# Patient Record
Sex: MALE | Race: WHITE | NOT HISPANIC OR LATINO | Employment: FULL TIME | URBAN - METROPOLITAN AREA
[De-identification: names, ages, dates, MRNs, and addresses within clinical notes are randomized per-mention and may not be internally consistent; named-entity substitution may affect disease eponyms.]

---

## 2018-09-15 ENCOUNTER — HOSPITAL ENCOUNTER (EMERGENCY)
Facility: HOSPITAL | Age: 53
Discharge: HOME | End: 2018-09-16
Attending: EMERGENCY MEDICINE
Payer: COMMERCIAL

## 2018-09-15 DIAGNOSIS — R06.02 SOB (SHORTNESS OF BREATH): ICD-10-CM

## 2018-09-15 DIAGNOSIS — R03.0 ELEVATED BLOOD PRESSURE READING WITHOUT DIAGNOSIS OF HYPERTENSION: ICD-10-CM

## 2018-09-15 DIAGNOSIS — R07.9 CHEST PAIN, UNSPECIFIED TYPE: Primary | ICD-10-CM

## 2018-09-15 LAB
ALBUMIN SERPL BCP-MCNC: 4.1 G/DL
ALP SERPL-CCNC: 58 U/L
ALT SERPL W/O P-5'-P-CCNC: 22 U/L
ANION GAP SERPL CALC-SCNC: 13 MMOL/L
AST SERPL-CCNC: 23 U/L
BASOPHILS # BLD AUTO: 0.04 K/UL
BASOPHILS NFR BLD: 0.6 %
BILIRUB SERPL-MCNC: 0.5 MG/DL
BNP SERPL-MCNC: 11 PG/ML
BUN SERPL-MCNC: 4 MG/DL
CALCIUM SERPL-MCNC: 9.3 MG/DL
CHLORIDE SERPL-SCNC: 101 MMOL/L
CO2 SERPL-SCNC: 18 MMOL/L
CREAT SERPL-MCNC: 0.9 MG/DL
DIFFERENTIAL METHOD: NORMAL
EOSINOPHIL # BLD AUTO: 0.2 K/UL
EOSINOPHIL NFR BLD: 2.1 %
ERYTHROCYTE [DISTWIDTH] IN BLOOD BY AUTOMATED COUNT: 12.9 %
EST. GFR  (AFRICAN AMERICAN): >60 ML/MIN/1.73 M^2
EST. GFR  (NON AFRICAN AMERICAN): >60 ML/MIN/1.73 M^2
GLUCOSE SERPL-MCNC: 113 MG/DL
HCT VFR BLD AUTO: 43.9 %
HGB BLD-MCNC: 15.3 G/DL
LYMPHOCYTES # BLD AUTO: 2.5 K/UL
LYMPHOCYTES NFR BLD: 36 %
MCH RBC QN AUTO: 28.5 PG
MCHC RBC AUTO-ENTMCNC: 34.9 G/DL
MCV RBC AUTO: 82 FL
MONOCYTES # BLD AUTO: 0.6 K/UL
MONOCYTES NFR BLD: 8.9 %
NEUTROPHILS # BLD AUTO: 3.7 K/UL
NEUTROPHILS NFR BLD: 52.1 %
PLATELET # BLD AUTO: 219 K/UL
PMV BLD AUTO: 11.4 FL
POTASSIUM SERPL-SCNC: 3.6 MMOL/L
PROT SERPL-MCNC: 7.3 G/DL
RBC # BLD AUTO: 5.37 M/UL
SODIUM SERPL-SCNC: 132 MMOL/L
TROPONIN I SERPL DL<=0.01 NG/ML-MCNC: <0.006 NG/ML
WBC # BLD AUTO: 7 K/UL

## 2018-09-15 PROCEDURE — 93010 ELECTROCARDIOGRAM REPORT: CPT | Mod: ,,, | Performed by: NUCLEAR MEDICINE

## 2018-09-15 PROCEDURE — 85025 COMPLETE CBC W/AUTO DIFF WBC: CPT

## 2018-09-15 PROCEDURE — 99900035 HC TECH TIME PER 15 MIN (STAT)

## 2018-09-15 PROCEDURE — 83880 ASSAY OF NATRIURETIC PEPTIDE: CPT

## 2018-09-15 PROCEDURE — 25000003 PHARM REV CODE 250: Performed by: EMERGENCY MEDICINE

## 2018-09-15 PROCEDURE — 99284 EMERGENCY DEPT VISIT MOD MDM: CPT | Mod: 25

## 2018-09-15 PROCEDURE — 93005 ELECTROCARDIOGRAM TRACING: CPT

## 2018-09-15 PROCEDURE — 84484 ASSAY OF TROPONIN QUANT: CPT

## 2018-09-15 PROCEDURE — 80053 COMPREHEN METABOLIC PANEL: CPT

## 2018-09-15 RX ORDER — LORAZEPAM 0.5 MG/1
0.5 TABLET ORAL EVERY 6 HOURS PRN
COMMUNITY

## 2018-09-15 RX ORDER — NAPROXEN SODIUM 220 MG/1
324 TABLET, FILM COATED ORAL
Status: COMPLETED | OUTPATIENT
Start: 2018-09-15 | End: 2018-09-15

## 2018-09-15 RX ADMIN — ASPIRIN 81 MG 324 MG: 81 TABLET ORAL at 10:09

## 2018-09-16 VITALS
SYSTOLIC BLOOD PRESSURE: 136 MMHG | TEMPERATURE: 99 F | DIASTOLIC BLOOD PRESSURE: 90 MMHG | WEIGHT: 175 LBS | OXYGEN SATURATION: 98 % | HEART RATE: 59 BPM | RESPIRATION RATE: 12 BRPM

## 2018-09-16 LAB — TROPONIN I SERPL DL<=0.01 NG/ML-MCNC: <0.006 NG/ML

## 2018-09-16 PROCEDURE — 84484 ASSAY OF TROPONIN QUANT: CPT

## 2018-09-16 NOTE — ED NOTES
"Patient verbally verified and Spelled Full Name and Date of Birth.  Pt C/O SOB & chest tightness since yesterday evening & today has been out in heat & symptoms have gotten worse.  Pt states he has  had these symptoms off & on for about past 5 yrs or so. Also had slight nausea earlier tonight. He describes the SOB as "a feeling of not being able to get enough air in & not able to cool down when he gets overheated"  Hx of anxiety.  LOC: The patient is awake, alert and aware of environment with an appropriate affect, the patient is oriented x 3 and speaking appropriately.  APPEARANCE: Patient resting comfortably and in no acute distress, patient is clean and well groomed, patient's clothing is properly fastened.  HEENT: Brief WNL  SKIN: warm & dry   MUSCULOSKELETAL: Brief WNL, MAEW  RESPIRATORY: chest clear blanca, exchanging well.  Denies cough or cold  CARDIAC: Sinus rhythm, no ectopy, rates pain at 7/10  GASTRO: Brief WNL, soft ,flat, denies nausea at this time  : Brief WNL  Peripheral Vasc: No peripheral edema, positive distal pulses  NEURO: Brief WNL  PSYCH: Brief WNL  "

## 2018-09-16 NOTE — ED PROVIDER NOTES
Encounter Date: 9/15/2018       History     Chief Complaint   Patient presents with    Shortness of Breath     + chest tightness, started yesterday, and has worked in the heat today.      Patient currently presents with chief complaint of chest pain.  This began yesterday but has been intermittent.  This is localized to the mid chest region.  Patient reports shortness of breath, denies palpitations,  denies nausea, denies diaphoresis.  Radiation of pain:  none.  Patient denies aspirin use in the last 24 hours. Patient denies history of known CAD.  Cardiac risk factors include:  hypertension.            Review of patient's allergies indicates:   Allergen Reactions    Cortisone     Demerol [meperidine]      Past Medical History:   Diagnosis Date    Anxiety      Past Surgical History:   Procedure Laterality Date    HERNIA REPAIR      SINUS SURGERY       History reviewed. No pertinent family history.  Social History     Tobacco Use    Smoking status: Never Smoker   Substance Use Topics    Alcohol use: No     Frequency: Never     Comment: occasionally     Drug use: No     Review of Systems   Constitutional: Negative for chills and fever.   HENT: Negative for congestion.    Respiratory: Positive for chest tightness and shortness of breath.    Cardiovascular: Negative for leg swelling.   Gastrointestinal: Negative for abdominal pain, constipation, diarrhea, nausea and vomiting.   Genitourinary: Negative for dysuria, frequency and urgency.   Skin: Negative for color change and rash.   Allergic/Immunologic: Negative for immunocompromised state.   Neurological: Negative for weakness and numbness.   Hematological: Negative for adenopathy. Does not bruise/bleed easily.   All other systems reviewed and are negative.      Physical Exam     Initial Vitals   BP Pulse Resp Temp SpO2   09/15/18 2151 09/15/18 2151 09/15/18 2151 09/15/18 2155 09/15/18 2151   (!) 181/106 67 20 98.9 °F (37.2 °C) 100 %      MAP       --                 Vitals:    09/15/18 2152 09/15/18 2155 09/15/18 2202 09/15/18 2205   BP: (!) 181/106  (!) 171/103    Pulse: 69  69 67   Resp:   19    Temp:  98.9 °F (37.2 °C)     TempSrc:  Oral     SpO2: 99%  100%    Weight:        09/15/18 2217 09/15/18 2232 09/15/18 2247 09/15/18 2302   BP: (!) 168/94 (!) 148/93 (!) 150/92 (!) 146/92   Pulse: 65 63 65 62   Resp: 15 14 18 13   Temp:       TempSrc:       SpO2: 100% 98% 99% 100%   Weight:        09/15/18 2317 09/15/18 2347 09/16/18 0002 09/16/18 0017   BP: (!) 142/94 (!) 163/93 (!) 152/91 (!) 148/92   Pulse: 63 60 61 63   Resp: 19 14 15 16   Temp:       TempSrc:       SpO2: 99% 99% 99% 99%   Weight:        09/16/18 0032 09/16/18 0047 09/16/18 0102   BP: 139/87 130/89 (!) 136/90   Pulse: 62 (!) 59 (!) 59   Resp: 16 15 12   Temp:      TempSrc:      SpO2: 100% 99% 98%   Weight:            Physical Exam    Nursing note and vitals reviewed.  Constitutional: He appears well-developed and well-nourished. He is not diaphoretic. No distress.   HENT:   Head: Normocephalic and atraumatic.   Right Ear: External ear normal.   Left Ear: External ear normal.   Nose: Nose normal.   Mouth/Throat: Oropharynx is clear and moist.   Eyes: Conjunctivae and EOM are normal. Pupils are equal, round, and reactive to light. No scleral icterus.   Neck: Neck supple. No JVD present.   Cardiovascular: Normal rate, regular rhythm, normal heart sounds and intact distal pulses. Exam reveals no gallop and no friction rub.    No murmur heard.  Pulmonary/Chest: Breath sounds normal. No respiratory distress. He has no wheezes. He has no rhonchi. He has no rales.   Abdominal: Soft. Bowel sounds are normal. He exhibits no distension. There is no tenderness.   Musculoskeletal: Normal range of motion. He exhibits no edema.   Neurological: He is alert and oriented to person, place, and time.   Skin: Skin is warm and dry. No rash noted.   Psychiatric: He has a normal mood and affect. His behavior is normal.         ED  Course   Procedures  Labs Reviewed   COMPREHENSIVE METABOLIC PANEL - Abnormal; Notable for the following components:       Result Value    Sodium 132 (*)     CO2 18 (*)     Glucose 113 (*)     BUN, Bld 4 (*)     All other components within normal limits   B-TYPE NATRIURETIC PEPTIDE   CBC W/ AUTO DIFFERENTIAL   TROPONIN I   TROPONIN I     EKG Readings: (Independently Interpreted)   Initial Reading: No STEMI. Rhythm: Normal Sinus Rhythm. Heart Rate: 66. Ectopy: No Ectopy. Axis: Normal.       Imaging Results          X-Ray Chest AP Portable (Final result)  Result time 09/15/18 22:13:01    Final result by Baldomero Huang MD (09/15/18 22:13:01)                 Impression:      No acute process seen.      Electronically signed by: Baldomero Huang MD  Date:    09/15/2018  Time:    22:13             Narrative:    EXAMINATION:  XR CHEST AP PORTABLE    CLINICAL HISTORY:  SOB;    FINDINGS:  Single view of the chest.    Cardiac silhouette is normal.  The lungs demonstrate no evidence of active disease.  No evidence of pleural effusion or pneumothorax.  Bones appear intact.                                 Medical Decision Making:   Differential Diagnosis:   GAUDENCIO Score                           Age >/= 65   No  >/=3 Risk Factors  No       FH CAD    No       HTN    Yes        HLD    No       DM     No       Current smoker   No  Known CAD   No  ASA use in past 7days No  Severe angina   No  ST elevation   No  Elevated cardiac markers No    ED Management:  All findings were reviewed with the patient/family in detail along with the diagnosis of chest pain.  Given the patient's low risk for MACE based on a carefully determined GAUDENCIO score (<2) and overall clinical judgement, unremarkable troponin levels drawn at the time of arrival and again 2 hours later have been used to effectively rule out ACS (as per AHA recommendations).  Additionally, I have no considerable suspicion for aortic dissection/aneurysm, esophageal perforation, or acute  pulmonary complications based on the presentation, exam, lab results, or imaging.    I see no indication of an emergent process beyond that addressed during our encounter but have duly counseled the patient/family regarding the need for prompt outpatient follow-up as well as the indications that should prompt immediate return to the emergency room should new or worrisome developments occur.  The patient has been provided with both verbal and written instructions following the encounter.  The patient/family communicates understanding of all this information and all remaining questions and concerns were addressed at this time.                        Clinical Impression:   The primary encounter diagnosis was Chest pain, unspecified type. Diagnoses of SOB (shortness of breath) and Elevated blood pressure reading without diagnosis of hypertension were also pertinent to this visit.                             Marcelo Russ MD  09/18/18 [id number]

## 2020-06-24 NOTE — ED NOTES
Chronic, ongoing.  He indicates that he is feeling well and denies any symptoms referable to this diagnoses. Specifically denies chest pain, palpitations, dyspnea, orthopnea, PND or peripheral edema, polyuria, polydipsia, urinary complaints, abdominal complaints, myalgias, numbness, weakness or other related symptoms.   Medications: Metformin 1000 mg twice daily.  Statin: Pravastatin 40 mg/day.  ACEI/ARB: None currently.  ASA: Contraindicated, history of GI bleed.  Exercise: Minimal.  Last eye exam: Has eye exam coming up. Denies visual blurring, double vision, eye pain and floaters.  Last monofilament foot exam: 2/20/2020. Denies foot pain, numbness, calluses, ulcers.  Diabetic complications: none  A1c:   Lab Results   Component Value Date/Time    HBA1C 5.1 06/24/2020 09:41 AM    HBA1C 5.7 (H) 09/06/2019 07:30 AM    HBA1C 6.0 (H) 05/22/2019 10:17 AM      Denies chest pain or SOB, will continue to monitor

## 2020-07-28 ENCOUNTER — HOSPITAL ENCOUNTER (OUTPATIENT)
Dept: RADIOLOGY | Facility: HOSPITAL | Age: 55
Discharge: HOME | End: 2020-07-28
Attending: INTERNAL MEDICINE
Payer: COMMERCIAL

## 2020-07-28 DIAGNOSIS — M79.651 RIGHT THIGH PAIN: ICD-10-CM

## 2020-07-28 DIAGNOSIS — M79.651 RIGHT THIGH PAIN: Primary | ICD-10-CM

## 2020-07-28 PROCEDURE — 73552 X-RAY EXAM OF FEMUR 2/>: CPT | Mod: TC,PO,RT

## 2020-07-28 PROCEDURE — 73552 X-RAY EXAM OF FEMUR 2/>: CPT | Mod: 26,RT,, | Performed by: RADIOLOGY

## 2020-07-28 PROCEDURE — 73552 XR FEMUR 2 VIEW RIGHT: ICD-10-PCS | Mod: 26,RT,, | Performed by: RADIOLOGY

## 2021-01-19 ENCOUNTER — HOSPITAL ENCOUNTER (OUTPATIENT)
Dept: RADIOLOGY | Facility: HOSPITAL | Age: 56
Discharge: HOME | End: 2021-01-19
Payer: COMMERCIAL

## 2021-01-19 DIAGNOSIS — R07.89 OTHER CHEST PAIN: ICD-10-CM

## 2021-01-19 DIAGNOSIS — R07.89 OTHER CHEST PAIN: Primary | ICD-10-CM

## 2021-01-19 PROCEDURE — 71101 XR RIBS MIN 3 VIEWS W/ PA CHEST RIGHT: ICD-10-PCS | Mod: 26,RT,, | Performed by: RADIOLOGY

## 2021-01-19 PROCEDURE — 71101 X-RAY EXAM UNILAT RIBS/CHEST: CPT | Mod: 26,RT,, | Performed by: RADIOLOGY

## 2021-01-19 PROCEDURE — 71101 X-RAY EXAM UNILAT RIBS/CHEST: CPT | Mod: TC,PO,RT

## 2022-11-22 ENCOUNTER — HOSPITAL ENCOUNTER (OUTPATIENT)
Dept: RADIOLOGY | Facility: HOSPITAL | Age: 57
Discharge: HOME | End: 2022-11-22
Attending: INTERNAL MEDICINE
Payer: COMMERCIAL

## 2022-11-22 DIAGNOSIS — K92.1 MELENA: ICD-10-CM

## 2022-11-22 DIAGNOSIS — R19.7 DIARRHEA: ICD-10-CM

## 2022-11-22 DIAGNOSIS — R19.7 DIARRHEA: Primary | ICD-10-CM

## 2022-11-22 PROCEDURE — 74019 RADEX ABDOMEN 2 VIEWS: CPT | Mod: 26,,, | Performed by: RADIOLOGY

## 2022-11-22 PROCEDURE — 74019 XR ABDOMEN FLAT AND ERECT: ICD-10-PCS | Mod: 26,,, | Performed by: RADIOLOGY

## 2022-11-22 PROCEDURE — 74019 RADEX ABDOMEN 2 VIEWS: CPT | Mod: TC,PO
